# Patient Record
Sex: FEMALE | Race: BLACK OR AFRICAN AMERICAN | NOT HISPANIC OR LATINO | Employment: STUDENT | URBAN - METROPOLITAN AREA
[De-identification: names, ages, dates, MRNs, and addresses within clinical notes are randomized per-mention and may not be internally consistent; named-entity substitution may affect disease eponyms.]

---
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
## 2019-06-30 ENCOUNTER — HOSPITAL ENCOUNTER (EMERGENCY)
Facility: HOSPITAL | Age: 19
Discharge: HOME | End: 2019-06-30
Attending: EMERGENCY MEDICINE
Payer: MEDICAID

## 2019-06-30 VITALS
WEIGHT: 140 LBS | DIASTOLIC BLOOD PRESSURE: 76 MMHG | HEIGHT: 65 IN | OXYGEN SATURATION: 100 % | TEMPERATURE: 98 F | BODY MASS INDEX: 23.32 KG/M2 | HEART RATE: 75 BPM | RESPIRATION RATE: 18 BRPM | SYSTOLIC BLOOD PRESSURE: 124 MMHG

## 2019-06-30 DIAGNOSIS — R51.9 HEADACHE DISORDER: Primary | ICD-10-CM

## 2019-06-30 LAB
B-HCG UR QL: NEGATIVE
CTP QC/QA: YES

## 2019-06-30 PROCEDURE — 25000003 PHARM REV CODE 250: Performed by: PHYSICIAN ASSISTANT

## 2019-06-30 PROCEDURE — 99283 EMERGENCY DEPT VISIT LOW MDM: CPT | Mod: 25

## 2019-06-30 PROCEDURE — 81025 URINE PREGNANCY TEST: CPT | Performed by: NURSE PRACTITIONER

## 2019-06-30 RX ORDER — BUTALBITAL, ACETAMINOPHEN AND CAFFEINE 50; 325; 40 MG/1; MG/1; MG/1
1 TABLET ORAL
Status: DISCONTINUED | OUTPATIENT
Start: 2019-06-30 | End: 2019-06-30

## 2019-06-30 RX ORDER — IBUPROFEN 200 MG
200 TABLET ORAL EVERY 6 HOURS PRN
COMMUNITY
End: 2022-01-10 | Stop reason: DRUGHIGH

## 2019-06-30 RX ORDER — BUTALBITAL, ACETAMINOPHEN AND CAFFEINE 50; 325; 40 MG/1; MG/1; MG/1
1 TABLET ORAL EVERY 4 HOURS PRN
Qty: 20 TABLET | Refills: 0 | Status: SHIPPED | OUTPATIENT
Start: 2019-06-30 | End: 2019-07-30

## 2019-06-30 RX ORDER — BUTALBITAL, ACETAMINOPHEN AND CAFFEINE 50; 325; 40 MG/1; MG/1; MG/1
1 TABLET ORAL
Status: COMPLETED | OUTPATIENT
Start: 2019-06-30 | End: 2019-06-30

## 2019-06-30 RX ADMIN — BUTALBITAL, ACETAMINOPHEN AND CAFFEINE 1 TABLET: 50; 325; 40 TABLET ORAL at 01:06

## 2019-06-30 NOTE — ED PROVIDER NOTES
"Encounter Date: 6/30/2019    SCRIBE #1 NOTE: I, Lisa Ramey, am scribing for, and in the presence of,  Filippo Rosario PA-C. I have scribed the following portions of the note - Other sections scribed: HPI/ROS.       History     Chief Complaint   Patient presents with    Headache     "i have severe headache since last night, took advil this morning that did not help. denies blurred vision, dizziness, nausea.     CC: Headache    HPI: This 19 y.o. Female presents to the ED for an emergent evaluation of a headache. Pt reports she has a hx of headaches that are localized more so to the L, frontal aspect but states locations usually shift. States she has been getting headaches at least 1x/week. Pt reports a L, frontal headache last night. Today, she reports the headache is now to the R, frontal aspect noting she attempted tx with 400mg Advil this morinng. Currently rates as 5/10 and describes as throbbing. States headaches have gradually worsened within the last few months or so. Previously, she wooul take 1 packet of Goody's BC powder, Tylenol, and 400mg Advil. No recent head trauma. She has never been evaluated by a neurologist nor a PCP for these recurrent headaches. No alleviating factors. Denies fever, chills, n/v/d, dizziness, light-headedness, visual disturbances, ear pain, tinnitus, and any other associated symptoms.     The history is provided by the patient. No  was used.     Review of patient's allergies indicates:  No Known Allergies  Past Medical History:   Diagnosis Date    Heart murmur     pt reported      History reviewed. No pertinent surgical history.  History reviewed. No pertinent family history.  Social History     Tobacco Use    Smoking status: Never Smoker    Smokeless tobacco: Never Used   Substance Use Topics    Alcohol use: No    Drug use: No     Review of Systems   Constitutional: Negative for chills and fever.   HENT: Negative for ear pain and tinnitus.    Eyes: " Negative for visual disturbance.   Gastrointestinal: Negative for diarrhea, nausea and vomiting.   Neurological: Positive for headaches. Negative for dizziness, weakness, light-headedness and numbness.   All other systems reviewed and are negative.      Physical Exam     Initial Vitals [06/30/19 1234]   BP Pulse Resp Temp SpO2   (!) 161/73 86 18 98 °F (36.7 °C) 100 %      MAP       --         Physical Exam    Nursing note and vitals reviewed.  Constitutional: She appears well-developed and well-nourished. She is not diaphoretic. No distress.   Well-appearing and nontoxic.  Resting comfortably on exam table.   HENT:   Head: Normocephalic and atraumatic.   Eyes: Conjunctivae and EOM are normal. Pupils are equal, round, and reactive to light.   Neck: Normal range of motion. Neck supple. No tracheal deviation present.   Cardiovascular: Intact distal pulses.   Pulmonary/Chest: Breath sounds normal. No stridor. No respiratory distress.   Abdominal: Soft. Bowel sounds are normal. She exhibits no distension. There is no tenderness.   Musculoskeletal: Normal range of motion. She exhibits no tenderness.   Lymphadenopathy:     She has no cervical adenopathy.   Neurological: She is alert and oriented to person, place, and time. GCS score is 15. GCS eye subscore is 4. GCS verbal subscore is 5. GCS motor subscore is 6.   Grossly equal , biceps, triceps, hip flexion, knee flexion, knee extension strength bilaterally. Negative Romberg, no pronator drift.  Normal tandem gait.  No truncal ataxia.  Normal finger-to-nose bilaterally.   Skin: Skin is warm and dry. Capillary refill takes less than 2 seconds.   Psychiatric: She has a normal mood and affect. Her behavior is normal. Judgment and thought content normal.         ED Course   Procedures  Labs Reviewed   POCT URINE PREGNANCY          Imaging Results    None          Medical Decision Making:   Differential Diagnosis:   Headache disorder unspecified, migraine, temporal  arteritis  ED Management:  19-year-old female with a subjective history of frequent headaches presents to ED complaining of right-sided frontal/temporal headache. Patient states this is similar to her previous headaches, however they are usually left-sided.  She admits to left-sided headache last night.  She woke this morning with right-sided headache. No nausea vomiting. No lightheadedness or dizziness.  No visual disturbance. No trauma. No fever. No neck pain/stiffness. She is hypertensive. Will repeat BP. No trauma, no family or personal hx of cerebral aneurysm. No focal deficit.  Repeat BP much improved.     Headache resolved.  I will DC with Fioricet p.r.n..  I will ask her to follow up with primary, neurology, for re-evaluation of recurrent headaches.  I have given return precautions.  She does understand and agree.            Scribe Attestation:   Scribe #1: I performed the above scribed service and the documentation accurately describes the services I performed. I attest to the accuracy of the note.    Attending Attestation:           Physician Attestation for Scribe:  Physician Attestation Statement for Scribe #1: I, Filippo Rosario PA-C, reviewed documentation, as scribed by Lisa Ramey in my presence, and it is both accurate and complete.                    Clinical Impression:       ICD-10-CM ICD-9-CM   1. Headache disorder R51 784.0         Disposition:   Disposition: Discharged  Condition: Stable                        Filippo Rosario PA-C  06/30/19 1400

## 2019-06-30 NOTE — DISCHARGE INSTRUCTIONS
Fioricet as needed for headache. Follow-up with primary.  Follow-up with Neurology.  Return to this ED if you begin with blurred vision, if you begin with nausea vomiting, if you begin with lightheadedness and dizziness, if any other problems occur.

## 2019-06-30 NOTE — ED TRIAGE NOTES
The pt states her headache started last night. Reports she took medication at home, but it is not working.